# Patient Record
Sex: MALE | Race: WHITE | Employment: FULL TIME | URBAN - METROPOLITAN AREA
[De-identification: names, ages, dates, MRNs, and addresses within clinical notes are randomized per-mention and may not be internally consistent; named-entity substitution may affect disease eponyms.]

---

## 2019-11-06 ENCOUNTER — THERAPY VISIT (OUTPATIENT)
Dept: PHYSICAL THERAPY | Facility: CLINIC | Age: 24
End: 2019-11-06
Payer: COMMERCIAL

## 2019-11-06 DIAGNOSIS — M54.42 BILATERAL LOW BACK PAIN WITH LEFT-SIDED SCIATICA, UNSPECIFIED CHRONICITY: ICD-10-CM

## 2019-11-06 DIAGNOSIS — M25.512 LEFT SHOULDER PAIN, UNSPECIFIED CHRONICITY: ICD-10-CM

## 2019-11-06 DIAGNOSIS — M79.605 PAIN OF LEFT LOWER EXTREMITY: ICD-10-CM

## 2019-11-06 PROBLEM — M54.50 LUMBAGO: Status: ACTIVE | Noted: 2019-11-06

## 2019-11-06 PROCEDURE — 97110 THERAPEUTIC EXERCISES: CPT | Mod: GP | Performed by: PHYSICAL THERAPIST

## 2019-11-06 PROCEDURE — 97161 PT EVAL LOW COMPLEX 20 MIN: CPT | Mod: GP | Performed by: PHYSICAL THERAPIST

## 2019-11-06 NOTE — PROGRESS NOTES
[facility] Initial Evaluation  Subjective:  The history is provided by the patient. No  was used.     Physical Therapy Initial Evaluation: Subjective History    Injury/Medical History:  Presenting Complaint: Pain, Weakness or Functional Impairment  Acute Injury or Gradual Onset?:  Gradual injuiry over Ttme  Mechanism of Injury: (L) HS: Running injury in high school and late October, Shoulder: Gradual Onset, Back: Gradual onset  Date of Injury/Symptom Onset: 10/13/2019  Prior Treatment for Current Complaint:Physical Therapy  Self-Management strategies Stretching, exercises   General health status (as reported by patient): excellent  Past Medical History Comorbidity: Asthma   Allergies: none  Orthopaedic/Surgery history:none   Medication: None  Imaging: No recent imaging completed    Symptoms:   Location of symptoms: Back: (B) lower back and (L) leg and foot, Shoulder: Anterior (L) shoulder, HS: (L) HS Nature of symptoms:pain, stiffness/loss of motion, numbness and tingling   Constant or Activity/Position Dependent?: Intermittent   Time of day dependent?: After long duration sitting    Worst pain = 6/10 with activity, running, rocking climbing, sitting for long peridos.  Symptom Relief: laying down.  Best pain = 3/10.    Progression of Symptoms:Unchanged    Lifestyle & Functional Limitations:  Employment: Sr. Paid .    Primary Job or Home Tasks: self care, dressing, home maintenance, work and sports/recreational Computer Work, Prolonged Sitting  Physical Activity Level/Preferred Activities (within past year): running,soccer, rock climibing.    Functional Limitations (due to current complaint): Running, Rock climibing, sitting for long duration      (L) Hamstring injury during senior year in high school; I did strain back in October when playing soccer and felt the strain, I injured running during track, when I run quickly my hamstring feels really tight, I fear  that I may tweak or re injure, do not recall if I had any discoloration associated with the injury    My (L) shoulder has been bothersome for a while and rocking climbing is an issue secondary to pain, I feel like it is a ROM and weakness issue, sometimes I feel like I can  after rock climbing, sxs are dull and achy throughout the day, sharp pain intermittently, sxs location: anterior (L) shoulder.     I do have lower back pain and tingling to my feet, I am not sure if it is from the way I am sleeping, I can draw a line that goes lateral thigh, lateral shin, and dorsal foot, I feel like my (L) hip feels pinched, after long periods of sitting I feel the tingle and overall discomfort, do not do any type of relief currently for my back, denies N/T at this time, I find numbness increase when my hips are greater than 90 degrees of flexion when sitting for long periods.       THERAPIST IMPRESSION:   Elaine, 23 year old male, seeks physical therapy for his (L) Hamstring, (L) shoulder, (L) lower back pain, and numbness and tingling to (L) foot. Patient may present with (L) shoulder impingement, (L) HS weakness and poor flexibility, and lumbar radiculopathy. Patient to benefit from skilled physical therapy to address impairments found during examination for return to PLOF, ADLs, running marathon, and sitting for long periods.             Objective:  SHOULDER EXAMINATION  Diagnosis: (L) Shoulder impingement      STATIC POSTURE  Forward head: moderate   Rounded shoulders:moderate  Shoulder internally rotated: moderate   Visual inspection: Elevated scapula (B), Anterior tilted scapula (B), Downward rotation (L) > (R) and Inferior boarder prominence (B)    DYNAMIC SCAPULAR TESTS  Dynamic Scapular Assessment: Scapular winging (B), Poor eccentric control (B), Delayed upward rotation (B) and increase in pain // with manual scapular assist full elimination of pain irritability    SHOULDER RANGE OF MOTION  AROM Flexion Abduction ER    Base Ext/IR Extension   Left WNL WNL  * WNL WNL WNL   Right WNL WNL   WNL WNL WNL   Pain: pain with (L) shoulder flexion at 120 degrees     SHOULDER STRENGTH  MMT Flexion Abduction Base ER Belly Press   Left 4-/5 4-/5 3+/5 5/5   Right 4/5 4/5 4-/5 5/5     SPECIAL TESTS Left Right   Impingement Positive Negative  Subscapularis Negative Negative    Labrum Negative Negative  Sulcus sign Negative Negative  Load and Shift Negative Negative  AC joint Negative Negative  Apprehension Negative Negative  Relocation Negative Negative  Scapula assist test Positive Negative    PALPATION  Left: moderate hypertonic cervicoscapular musculature   Right:  moderate hypertonic cervicoscapular musculature           LUMBAR:    Standing Posture: sway back pivot point and thoracolumbar junction, anterior pelvic tilt, kyphosis    AROM: Baseline: No sxs  Lumbar L R   Flexion (40-60 deg) Fingertips to distal shins, increase in N/T to (L) foot    Extension (20-35 deg 100%, no change in N/T sxs after flexion    SB (20-30 deg) NA NA   Rotation (13 deg) NA NA   Quadrant Testing NA NA       Repeated movement testing:   During: produces, abolishes, increases, decreases, no effect, centralizing, peripheralizing;   After: better, worse, no better, no worse, no effect, centralized, peripheralized  Lumbar L R   Flexion Increase in N/T sxs    Extension No change in N/T sxs    SB NA NA   Rotation NA NA     Prone on Elbows: No change in N/T sxs after 5 minutes     Neurological:    Motor Deficit: WNL  Myotomes L R   L1-2 (hip flexion) - -   L3 (knee extension) - -   L4 (ankle DF) - -   L5 (g. toe ext) - -   S1 (ankle PF or knee flex) - -   S2 (hip extensors) - -     Sensory Deficit: + for L5/S1  Dermatomes L R   L1-2  - -   L3  - -   L4  - -   L5  + -   S1  + -   S2 - -     Reflexes: WNL      L R   Achilles (S1/2) 2+ 2+   Patella (L3/4) 2+ 2+     Dural Signs: WNL   L R   Slump - -   SLR (sciatic) - -   Femoral Nerve  NA NA   Tibial Nerve (SLR+DF+EV) - -    Peroneal (fibular) nerve (SLR+PF+INV) - -   Sural Nerve (SLR+DF+INV) - -     Special Tests:    L R   Femoral Thigh Thrust - -   PEPE - -   SIJ Compression - -   SIJ Distraction - -   Scour  - -   Ganslen's Test - -   Sacral Thrust - -   Gillet's test - -   Long Sitting Test NA NA   FADIR - -   Impingement Test  - -   Lumbar Distraction + -   Lumbar Compression NA NA     Palpation:    L R   Sacroiliac ligament Long - -   Sacroiliac ligament Short - -   Sacrotuberous ligament  - -   Psoas + +   Iliacus + +   Quadratus Lumborum + +   Lumbar Paraspinals +  +   Piriformis  - -   Glut Med  NA NA   Glut Max  NA NA   + = hypertonic and or TTP    SIJ Alignment: Symmetrical    Other Tests:    - Slick Test: (B) +    - Hamstrings 90/90: (B): 120 degrees, limited    -MMT HS: significant weakness (L) > (R)    - Palpation HS: no significant findings, normal muscle belly         TREATMENT  Ther Ex:  -HS stretch supine on floor with wall 3 sets x 30 sec   -foam roller pectoral stretch 3 sets x 30 sec   -Posture: scapular stability   -Ergonomic set up: hip flexion < 90 degrees  -Decompression to lumbar spine: use arm rests on chair to push up on, lay down  -prone on elbows 5 minutes     Plan for next treatment session:  -RTC strengthening: S/L ER/IR  -Prone Ts on swiss ball  -Standing D2 PNF  -body blade  -planks  -push up plus   -S/L lower trap    -Pelvic tilt  -Marching  -planks  -quadruped rockback    -HS curls on swiss ball  -prone knee flexion      Assessment/Plan:  Patient is a 23 year old male with lumbar, left side shoulder and left side hip complaints.    Patient has the following significant findings with corresponding treatment plan.                Diagnosis 1:  (L) Shoulder Impingment  Pain -  hot/cold therapy, electric stimulation, manual therapy, splint/taping/bracing/orthotics, self management, education and home program  Decreased strength - therapeutic exercise, therapeutic activities and home program  Impaired  muscle performance - electric stimulation, neuro re-education and home program  Decreased function - therapeutic activities, home program and functional performance testing  Impaired posture - neuro re-education, therapeutic activities and home program  Diagnosis 2:  Lumbar radiculopathy   Pain -  hot/cold therapy, electric stimulation, mechanical traction, manual therapy, splint/taping/bracing/orthotics, self management, education, directional preference exercise and home program  Decreased ROM/flexibility - manual therapy, therapeutic exercise, therapeutic activity and home program  Decreased strength - therapeutic exercise, therapeutic activities and home program  Impaired muscle performance - electric stimulation, neuro re-education and home program  Decreased function - therapeutic activities, home program and functional performance testing  Impaired posture - neuro re-education, therapeutic activities and home program  Diagnosis 3:  (L) HS strain  Pain -  hot/cold therapy, electric stimulation, manual therapy, splint/taping/bracing/orthotics, self management, education and home program  Decreased ROM/flexibility - manual therapy, therapeutic exercise, therapeutic activity and home program  Decreased strength - therapeutic exercise, therapeutic activities and home program  Impaired muscle performance - electric stimulation, neuro re-education and home program     Therapy Evaluation Codes:   1) History comprised of:   Personal factors that impact the plan of care:      None.    Comorbidity factors that impact the plan of care are:      Asthma.     Medications impacting care: None.  2) Examination of Body Systems comprised of:   Body structures and functions that impact the plan of care:      Hip, Lumbar spine and Shoulder.   Activity limitations that impact the plan of care are:      Reading/Computer work, Running, Sitting and Sleeping.  3) Clinical presentation characteristics  are:   Stable/Uncomplicated.  4) Decision-Making    Low complexity using standardized patient assessment instrument and/or measureable assessment of functional outcome.  Cumulative Therapy Evaluation is: Low complexity.    Previous and current functional limitations:  (See Goal Flow Sheet for this information)    Short term and Long term goals: (See Goal Flow Sheet for this information)     Communication ability:  Patient appears to be able to clearly communicate and understand verbal and written communication and follow directions correctly.  Treatment Explanation - The following has been discussed with the patient:   RX ordered/plan of care  Anticipated outcomes  Possible risks and side effects  This patient would benefit from PT intervention to resume normal activities.   Rehab potential is good.    Frequency:  2 X week, once daily  Duration:  for 8 weeks  Discharge Plan:  Achieve all LTG.  Independent in home treatment program.  Reach maximal therapeutic benefit.    Please refer to the daily flowsheet for treatment today, total treatment time and time spent performing 1:1 timed codes.

## 2019-11-08 ENCOUNTER — THERAPY VISIT (OUTPATIENT)
Dept: PHYSICAL THERAPY | Facility: CLINIC | Age: 24
End: 2019-11-08
Payer: COMMERCIAL

## 2019-11-08 DIAGNOSIS — M54.42 BILATERAL LOW BACK PAIN WITH LEFT-SIDED SCIATICA, UNSPECIFIED CHRONICITY: ICD-10-CM

## 2019-11-08 DIAGNOSIS — M79.605 PAIN OF LEFT LOWER EXTREMITY: ICD-10-CM

## 2019-11-08 DIAGNOSIS — M25.512 LEFT SHOULDER PAIN, UNSPECIFIED CHRONICITY: ICD-10-CM

## 2019-11-08 PROCEDURE — 97110 THERAPEUTIC EXERCISES: CPT | Mod: GP | Performed by: PHYSICAL THERAPIST

## 2019-11-08 PROCEDURE — 97112 NEUROMUSCULAR REEDUCATION: CPT | Mod: GP | Performed by: PHYSICAL THERAPIST

## 2019-11-08 NOTE — PROGRESS NOTES
Subjective:  Compliant with HEP  Ran 4 miles today, pace 7:45    Treatment:  Ther Ex:  -Reviewed HEP  -HS curls on swiss ball 3 sets x 12 reps  -prone knee flexion 3 sets x 12 reps  -S/L ER/IR 3 sets x 10 reps 2#/10#, cue for scapular stability  -Prone Ts on swiss ball, 3 sets x 10 reps    Neuro:  -Marching 3 sets x 10 reps, verbal cue for abdominal activation  -Planks 3 sets x 30 sec   -pelvic tilt    Not Completed Today  -HS stretch supine on floor with wall 3 sets x 30 sec   -foam roller pectoral stretch 3 sets x 30 sec   -Posture: scapular stability   -Ergonomic set up: hip flexion < 90 degrees  -Decompression to lumbar spine: use arm rests on chair to push up on, lay down  -prone on elbows 5 minutes      Plan for next treatment session:  -Standing D2 PNF  -body blade  -push up plus   -lower trap  -wall walks with theraband on foam roller   -rows    Assessment:  Reviewed HEP  Added additional exercises to address impairments to improve core and scapular stability along with isolated HS strengthening  Required moderate to max verbal and tactile cueing for scapular stability   Extension bias for core stability exercises secondary to concerns for disc herniation     Plan:  F/u in 2 weeks  Progress HS exercises

## 2019-11-15 ENCOUNTER — THERAPY VISIT (OUTPATIENT)
Dept: PHYSICAL THERAPY | Facility: CLINIC | Age: 24
End: 2019-11-15
Payer: COMMERCIAL

## 2019-11-15 DIAGNOSIS — M79.605 PAIN OF LEFT LOWER EXTREMITY: ICD-10-CM

## 2019-11-15 DIAGNOSIS — M54.42 BILATERAL LOW BACK PAIN WITH LEFT-SIDED SCIATICA, UNSPECIFIED CHRONICITY: ICD-10-CM

## 2019-11-15 DIAGNOSIS — M25.512 LEFT SHOULDER PAIN, UNSPECIFIED CHRONICITY: ICD-10-CM

## 2019-11-15 PROCEDURE — 97110 THERAPEUTIC EXERCISES: CPT | Mod: GP | Performed by: PHYSICAL THERAPIST

## 2019-11-15 PROCEDURE — 97530 THERAPEUTIC ACTIVITIES: CPT | Mod: GP | Performed by: PHYSICAL THERAPIST

## 2019-11-15 NOTE — PROGRESS NOTES
Subjective:  -Things are feeling good   -I still feel tightness along lateral thigh (L)  -I feel difference in shoulder in terms of mobility   -Tingling sxs in 1st, 2nd, and 3rd digit when completing foam roller pec stretch      Treatment:  Ther Ex:  -Reviewed HEP  ADDED:  -foam roller ITB/Quad  -push up plus -wall slides 3 sets  30 sec up down/left,right/circles     Ther Act:  -Sleeping position on side with pillow  -Running progression every 1 level 2 times pain free prior to progression, regress if you have any pain to previous pain free level     HEP  -HS stretch supine on floor with wall 3 sets x 30 sec   -foam roller pectoral stretch 3 sets x 30 sec   -Posture: scapular stability   -Ergonomic set up: hip flexion < 90 degrees  -Decompression to lumbar spine: use arm rests on chair to push up on, lay down  -prone on elbows 5 minutes   -HS curls on swiss ball 3 sets x 12 reps  -prone knee flexion 3 sets x 12 reps  -S/L ER/IR 3 sets x 10 reps 2#/10#, cue for scapular stability  -Prone Ts on swiss ball, 3 sets x 10 reps  -Marching 3 sets x 10 reps, verbal cue for abdominal activation  -Planks 3 sets x 30 sec   -pelvic tilt    Plan for next treatment session:  -Standing D2 PNF  -lower trap  -wall walks with theraband on foam roller   -rows     Assessment:  Reviewed HEP  Added additional exercises to address impairments to improve core and scapular stability along with foam roller to ITB/TFL improvement flexibility  Progressing very well   Educated running progression and patient compliant     Plan:  F/u in 2 weeks  Progress HEP as appropriate

## 2019-12-06 ENCOUNTER — THERAPY VISIT (OUTPATIENT)
Dept: PHYSICAL THERAPY | Facility: CLINIC | Age: 24
End: 2019-12-06
Payer: COMMERCIAL

## 2019-12-06 DIAGNOSIS — M54.42 BILATERAL LOW BACK PAIN WITH LEFT-SIDED SCIATICA, UNSPECIFIED CHRONICITY: ICD-10-CM

## 2019-12-06 DIAGNOSIS — M25.512 LEFT SHOULDER PAIN, UNSPECIFIED CHRONICITY: ICD-10-CM

## 2019-12-06 DIAGNOSIS — M79.605 PAIN OF LEFT LOWER EXTREMITY: ICD-10-CM

## 2019-12-06 PROCEDURE — 97112 NEUROMUSCULAR REEDUCATION: CPT | Mod: GP | Performed by: PHYSICAL THERAPIST

## 2019-12-06 PROCEDURE — 97110 THERAPEUTIC EXERCISES: CPT | Mod: GP | Performed by: PHYSICAL THERAPIST

## 2019-12-06 NOTE — PROGRESS NOTES
Subjective:  I feel good  HEP has been going well  Maggie been sick all week so I have not been able to complete HEP   I feel like my shoulder ROM is feeling better especially when completing weighted overhead exercises  I feel like my (L) shoulder feels weaker when completing chest press   I completed turkey run but still have fear of re-injuring HS.      Treatment:  Ther Ex:  -Reviewed HEP  ADDED:  -SL Bridge with HS curl with slider 3 sets x 10 reps  -overhead press VC for proper form and stability, recommended weight, eccentric control    Neuro:  -SL rotation with tubing 3 sets x 10 reps yellow TB  -RDLs single leg table height VC knee knee slightly bent 3 sets x 10 reps     HEP  -HS stretch supine on floor with wall 3 sets x 30 sec   -foam roller pectoral stretch 3 sets x 30 sec   -Posture: scapular stability   -Ergonomic set up: hip flexion < 90 degrees  -Decompression to lumbar spine: use arm rests on chair to push up on, lay down  -prone on elbows 5 minutes   -HS curls on swiss ball 3 sets x 12 reps  -prone knee flexion 3 sets x 12 reps  -S/L ER/IR 3 sets x 10 reps 2#/10#, cue for scapular stability  -Prone Ts on swiss ball, 3 sets x 10 reps  -Marching 3 sets x 10 reps, verbal cue for abdominal activation  -Planks 3 sets x 30 sec   -pelvic tilt  -foam roller ITB/Quad  -push up plus -wall slides 3 sets  30 sec up down/left,right/circles   -RDLs single leg table height VC knee knee slightly bent 3 sets x 10 reps  -SL Bridge with HS curl with slider 3 sets x 10 reps  -overhead press VC for proper form and stability, recommended weight, eccentric control  -SL rotation with tubing 3 sets x 10 reps yellow TB    Plan for next treatment session:  -Standing D2 PNF  -lower trap  -wall walks with theraband on foam roller   -rows     Assessment:  Progressing very well  Introduced progression for HS eccentric and patient tolerated well     Plan:  F/u if needed

## 2020-03-13 PROBLEM — M79.605 PAIN OF LEFT LOWER EXTREMITY: Status: RESOLVED | Noted: 2019-11-06 | Resolved: 2020-03-13

## 2020-03-13 PROBLEM — M54.50 LUMBAGO: Status: RESOLVED | Noted: 2019-11-06 | Resolved: 2020-03-13

## 2020-03-13 PROBLEM — M25.512 SHOULDER PAIN, LEFT: Status: RESOLVED | Noted: 2019-11-06 | Resolved: 2020-03-13

## 2020-03-13 NOTE — PROGRESS NOTES
Discharge Note  Progress reporting period is from Nov 06, 2019 to Dec 6, 2019.     Elaine failed to return for next follow up visit and current status is unknown.  Please see information below for last relevant information on current status.  Patient seen for Rxs Used: 4 visits.  SUBJECTIVE  Subjective changes noted by patient:  Subjective: refer to note  .  Current pain level is  .     Previous pain level was   .   Changes in function:  Yes (See Goal flowsheet attached for changes in current functional level)  Adverse reaction to treatment or activity: None    OBJECTIVE  Changes noted in objective findings: Objective: refer to notes     ASSESSMENT/PLAN  Diagnosis: L HS, (L) shoudler Impingement, lumbar with radiculopathy   Updated problem list and treatment plan:   discharge with HEP  STG/LTGs have been met or progress has been made towards goals:  Yes, please see goal flowsheet for most current information  Assessment of Progress: current status is unknown.  Last current status:     Self Management Plans:  HEP  I have re-evaluated this patient and find that the nature, scope, duration and intensity of the therapy is appropriate for the medical condition of the patient.  Elaine continues to require the following intervention to meet STG and LTG's:  HEP.    Recommendations:  Discharge with current home program.  Patient to follow up with MD as needed.    Please refer to the daily flowsheet for treatment today, total treatment time and time spent performing 1:1 timed codes.

## 2020-12-24 ENCOUNTER — NURSE TRIAGE (OUTPATIENT)
Dept: NURSING | Facility: CLINIC | Age: 25
End: 2020-12-24

## 2021-04-16 ENCOUNTER — THERAPY VISIT (OUTPATIENT)
Dept: PHYSICAL THERAPY | Facility: CLINIC | Age: 26
End: 2021-04-16
Payer: COMMERCIAL

## 2021-04-16 DIAGNOSIS — M25.561 ACUTE PAIN OF RIGHT KNEE: Primary | ICD-10-CM

## 2021-04-16 PROCEDURE — 97161 PT EVAL LOW COMPLEX 20 MIN: CPT | Mod: GP | Performed by: PHYSICAL THERAPIST

## 2021-04-16 PROCEDURE — 97110 THERAPEUTIC EXERCISES: CPT | Mod: GP | Performed by: PHYSICAL THERAPIST

## 2021-04-16 ASSESSMENT — ACTIVITIES OF DAILY LIVING (ADL)
SWELLING: I DO NOT HAVE THE SYMPTOM
GO DOWN STAIRS: ACTIVITY IS SOMEWHAT DIFFICULT
STAND: ACTIVITY IS MINIMALLY DIFFICULT
GO UP STAIRS: ACTIVITY IS SOMEWHAT DIFFICULT
HOW_WOULD_YOU_RATE_THE_OVERALL_FUNCTION_OF_YOUR_KNEE_DURING_YOUR_USUAL_DAILY_ACTIVITIES?: ABNORMAL
PAIN: THE SYMPTOM AFFECTS MY ACTIVITY MODERATELY
AS_A_RESULT_OF_YOUR_KNEE_INJURY,_HOW_WOULD_YOU_RATE_YOUR_CURRENT_LEVEL_OF_DAILY_ACTIVITY?: ABNORMAL
KNEE_ACTIVITY_OF_DAILY_LIVING_SCORE: 60
KNEE_ACTIVITY_OF_DAILY_LIVING_SUM: 42
SQUAT: ACTIVITY IS SOMEWHAT DIFFICULT
RAW_SCORE: 42
HOW_WOULD_YOU_RATE_THE_CURRENT_FUNCTION_OF_YOUR_KNEE_DURING_YOUR_USUAL_DAILY_ACTIVITIES_ON_A_SCALE_FROM_0_TO_100_WITH_100_BEING_YOUR_LEVEL_OF_KNEE_FUNCTION_PRIOR_TO_YOUR_INJURY_AND_0_BEING_THE_INABILITY_TO_PERFORM_ANY_OF_YOUR_USUAL_DAILY_ACTIVITIES?: 75
GIVING WAY, BUCKLING OR SHIFTING OF KNEE: THE SYMPTOM AFFECTS MY ACTIVITY SLIGHTLY
STIFFNESS: THE SYMPTOM AFFECTS MY ACTIVITY MODERATELY
WEAKNESS: THE SYMPTOM AFFECTS MY ACTIVITY SEVERELY
WALK: ACTIVITY IS MINIMALLY DIFFICULT
RISE FROM A CHAIR: ACTIVITY IS MINIMALLY DIFFICULT
LIMPING: THE SYMPTOM AFFECTS MY ACTIVITY MODERATELY
SIT WITH YOUR KNEE BENT: ACTIVITY IS MINIMALLY DIFFICULT
KNEEL ON THE FRONT OF YOUR KNEE: ACTIVITY IS FAIRLY DIFFICULT

## 2021-04-16 NOTE — PROGRESS NOTES
[facility] Initial Evaluation  4/16/2021     Precautions/Restrictions/MD instructions: Self-referral for R knee pain    Therapist Assessment: Elaine Dias is a 25 year old male patient presenting to Physical Therapy with R knee pain. Patient demonstrates increased R knee pain with single-leg loading activities (single leg squats, step ups, and lateral step downs), as well as glute weakness. Signs and symptoms are consistent with likely R patellofemoral mal-tracking. These impairments limit their ability to run, squat, and perform normal sporting activities. Skilled PT services are necessary in order to reduce impairments and improve independent function.    Subjective:     Injury/Condition Details:  Presenting Complaint R knee pain   Onset Timing/Date March 1, 2021; Pt is self-referral.    Mechanism R knee began to hurt after several days of exercising and doing some cross-country skiing in late February / early March 2021.     Symptom Behavior Details    Primary Symptoms Sporadic symptoms; Activity/position dependent, pain (Location: R knee underneath patellar, Quality: Sharp), weakness   Worst Pain 7/10   Symptom Provocators Squatting, running, sports   Best Pain 1/10    Symptom Relievers Avoiding aggravating movements, icing   Time of day dependent? No   Recent symptom change? symptoms improving     Prior Testing/Intervention for current condition:  Prior Tests None   Prior Treatment none     Lifestyle & General Medical History:  Employment     Usual physical activities  (within past year) Running, weight training, soccer   Orthopaedic History  Recurring hamstring strains B, previous groin strains on R   Medication  None   Notable medical history See Epic Chart   Patient goals Run without pain; play soccer this summer   Patient Reported Health excellent     Red Flags: (Bold when present) - reviewed the following and denies  Calf pain/swelling/warmth, malaise,  unexplained weight loss, night pain, fever    Objective:    Posture: no notable deficits    Functional:   - DL squat: mild R knee knee pain in depth of squat   - SL squat: mild pain and difficulty controlling R knee in space    - Single leg step up:   R: mild increase in R knee pain with eccentric control back to floor (decreased with McConnel taping medial glide)   L: no deficits     - Lateral step down:   R: mild increase in R knee pain (decreased with McConnel taping medial glide)   L: no deficits noted    SL Balance:   R: 30 sec; observations: proper ankle strategy  L: 30 sec; observations: proper ankle strategy     Gait: no notable deficits    Palpation: no tenderness to palpation throughout B knees    Other:  - Swelling: none noted    KNEE: (* indicates patient's primary complaint)   PROM R PROM L AROM R AROM L MMT R MMT L   Ext   WNL WNL 5 5   Flx   WNL WNL 4+ 4+     Quadriceps Muscle Activation Right Left   Isometric Quad Activation Normal Normal   Straight Leg Raising No extensor lag No extensor lag     Quadriceps flexibility: mild tightness noted B  Hamstring flexibility: mild tightness noted B    HIP: (* indicates patient's primary complaint)   PROM R PROM L MMT R MMT L   ABD   3+ 4   EXT   4+ 4+     Special tests:   L R   Anterior Drawer - -   Posterior Drawer     Lachman's Mildly increased anterior translation of tibia, but no pain Mildly increased anterior translation of tibia, but no pain   Valgus 0 degrees - -   Valgus 30 degrees - -   Varus 0 degrees - -   Varus 30 degrees - -   Leah's - -   Appley's     Lateral Compression     Patellar Compression - -     ASSESSMENT/PLAN  Patient is a 25 year old male with right side knee complaints.    Patient has the following significant findings with corresponding treatment plan.                Diagnosis 1:  R knee pain; signs and symptoms consistent with likely patellofemoral mal-tracking on R, as well as weak glute musculature    Pain -  hot/cold therapy,  US, electric stimulation, manual therapy, splint/taping/bracing/orthotics, self management, education and home program  Decreased strength - therapeutic exercise, therapeutic activities and home program  Impaired muscle performance - neuro re-education and home program  Decreased function - therapeutic activities and home program    Therapy Evaluation Codes:   1) History comprised of:   Personal factors that impact the plan of care:      None.    Comorbidity factors that impact the plan of care are:      None.     Medications impacting care: None.  2) Examination of Body Systems comprised of:   Body structures and functions that impact the plan of care:      Knee.   Activity limitations that impact the plan of care are:      Jumping, Running, Sports, Squatting/kneeling, Stairs and Walking.  3) Clinical presentation characteristics are:   Stable/Uncomplicated.  4) Decision-Making    Low complexity using standardized patient assessment instrument and/or measureable assessment of functional outcome.  Cumulative Therapy Evaluation is: Low complexity.    Previous and current functional limitations:  (See Goal Flow Sheet for this information)    Short term and Long term goals: (See Goal Flow Sheet for this information)     Communication ability:  Patient appears to be able to clearly communicate and understand verbal and written communication and follow directions correctly.  Treatment Explanation - The following has been discussed with the patient:   RX ordered/plan of care  Anticipated outcomes  Possible risks and side effects  This patient would benefit from PT intervention to resume normal activities.   Rehab potential is excellent.    Frequency:  2 X a month, once daily  Duration:  for 3 months  Discharge Plan:  Achieve all LTG.  Independent in home treatment program.  Reach maximal therapeutic benefit.    Please refer to the daily flowsheet for treatment today, total treatment time and time spent performing 1:1  timed codes.

## 2021-04-28 ENCOUNTER — THERAPY VISIT (OUTPATIENT)
Dept: PHYSICAL THERAPY | Facility: CLINIC | Age: 26
End: 2021-04-28
Payer: COMMERCIAL

## 2021-04-28 DIAGNOSIS — M25.561 ACUTE PAIN OF RIGHT KNEE: ICD-10-CM

## 2021-04-28 PROCEDURE — 97140 MANUAL THERAPY 1/> REGIONS: CPT | Mod: GP | Performed by: PHYSICAL THERAPIST

## 2021-04-28 PROCEDURE — 97112 NEUROMUSCULAR REEDUCATION: CPT | Mod: GP | Performed by: PHYSICAL THERAPIST

## 2021-05-03 ENCOUNTER — THERAPY VISIT (OUTPATIENT)
Dept: PHYSICAL THERAPY | Facility: CLINIC | Age: 26
End: 2021-05-03
Payer: COMMERCIAL

## 2021-05-03 DIAGNOSIS — M25.561 ACUTE PAIN OF RIGHT KNEE: ICD-10-CM

## 2021-05-03 PROCEDURE — 97140 MANUAL THERAPY 1/> REGIONS: CPT | Mod: GP | Performed by: PHYSICAL THERAPIST

## 2021-05-03 PROCEDURE — 97112 NEUROMUSCULAR REEDUCATION: CPT | Mod: GP | Performed by: PHYSICAL THERAPIST

## 2021-05-03 NOTE — PROGRESS NOTES
Subjective:  HPI  Physical Exam                    Objective:  System    Physical Exam    General     ROS    Assessment/Plan:    PROGRESS  REPORT    Progress reporting period is from 4- to May 3, 2021.       SUBJECTIVE  Subjective changes noted by patient: Subjective: Status quo. He is getting stronger with exercise program    Current pain level is 4/10   Changes in function:  None  Adverse reaction to treatment or activity: None    OBJECTIVE  Changes noted in objective findings: Gait:  Walking: Left greater than right supinated foot mechanics, good pelvic stability in frontal plane during midstance,   Running: Good foot mechanics with running shoes, wide arm carriage, right greater than left loss of pelvic pelvic stability in frontal plane during mid stance phase, right greater than left over striding at initial contact, mild (B) cross of lower extremity with landing     ASSESSMENT/PLAN  Updated problem list and treatment plan: Diagnosis 1:    R knee pain; signs and symptoms consistent with likely patellofemoral mal-tracking on R, as well as weak glute musculature    Pain -  hot/cold therapy, US, electric stimulation, manual therapy, splint/taping/bracing/orthotics, self management, education and home program  Decreased strength - therapeutic exercise, therapeutic activities and home program  Impaired muscle performance - neuro re-education and home program  Decreased function - therapeutic activities and home program  STG/LTGs have been met or progress has been made towards goals:  Yes (See Goal flow sheet completed today.) and None  Assessment of Progress: The patient's condition is unchanged.  Self Management Plans:  Patient has been instructed in a home treatment program.  Patient  has been instructed in self management of symptoms.  I have re-evaluated this patient and find that the nature, scope, duration and intensity of the therapy is appropriate for the medical condition of the patient.  Elaine  continues to require the following intervention to meet STG and LTG's:  PT    Recommendations:  This patient would benefit from continued therapy.     Frequency:  1 X week, once daily  Duration:  for 6 weeks        Please refer to the daily flowsheet for treatment today, total treatment time and time spent performing 1:1 timed codes.

## 2021-07-11 ENCOUNTER — HEALTH MAINTENANCE LETTER (OUTPATIENT)
Age: 26
End: 2021-07-11

## 2021-09-05 ENCOUNTER — HEALTH MAINTENANCE LETTER (OUTPATIENT)
Age: 26
End: 2021-09-05

## 2022-08-07 ENCOUNTER — HEALTH MAINTENANCE LETTER (OUTPATIENT)
Age: 27
End: 2022-08-07

## 2022-10-23 ENCOUNTER — HEALTH MAINTENANCE LETTER (OUTPATIENT)
Age: 27
End: 2022-10-23

## 2023-08-27 ENCOUNTER — HEALTH MAINTENANCE LETTER (OUTPATIENT)
Age: 28
End: 2023-08-27